# Patient Record
Sex: MALE | Race: WHITE
[De-identification: names, ages, dates, MRNs, and addresses within clinical notes are randomized per-mention and may not be internally consistent; named-entity substitution may affect disease eponyms.]

---

## 2019-02-08 ENCOUNTER — HOSPITAL ENCOUNTER (OUTPATIENT)
Dept: HOSPITAL 95 - ORSCSDS | Age: 64
Discharge: HOME | End: 2019-02-08
Attending: INTERNAL MEDICINE
Payer: COMMERCIAL

## 2019-02-08 VITALS — BODY MASS INDEX: 33.84 KG/M2 | HEIGHT: 70 IN | WEIGHT: 236.38 LBS

## 2019-02-08 DIAGNOSIS — Z12.11: Primary | ICD-10-CM

## 2019-02-08 DIAGNOSIS — I10: ICD-10-CM

## 2019-02-08 DIAGNOSIS — K57.30: ICD-10-CM

## 2019-02-08 DIAGNOSIS — K64.8: ICD-10-CM

## 2019-02-08 DIAGNOSIS — E78.00: ICD-10-CM

## 2019-02-08 DIAGNOSIS — E66.01: ICD-10-CM

## 2019-02-08 DIAGNOSIS — Z79.899: ICD-10-CM

## 2019-02-08 PROCEDURE — 0DJD8ZZ INSPECTION OF LOWER INTESTINAL TRACT, VIA NATURAL OR ARTIFICIAL OPENING ENDOSCOPIC: ICD-10-PCS | Performed by: INTERNAL MEDICINE

## 2019-02-08 NOTE — NUR
02/08/19 0833 Wilma Kelly
PT. SATS RANGING FROM 89% ON RA UP TO 95% ON RA. PT. VERBALIZES
SOMETIMES HE CAN GET SOB WHEN JUST WATCHING T.V. & PUTS A PILLOW UNDER
HIS CHIN TO RAISE HIS CHIN UP WHICH HE VERBALIZES HELPS. PT. DENIES
SLEEP APNEA BUT DOES VERBALIZE THAT HE SNORES & SOMETIMES WAKES UP
FEELING LIKE HE NEEDS TO TAKE A DEEP BREATH.

## 2025-06-06 ENCOUNTER — HOSPITAL ENCOUNTER (OUTPATIENT)
Dept: HOSPITAL 95 - ORSCSDS | Age: 70
Discharge: HOME | End: 2025-06-06
Attending: INTERNAL MEDICINE
Payer: MEDICARE

## 2025-06-06 VITALS — BODY MASS INDEX: 35.85 KG/M2 | WEIGHT: 250.45 LBS | HEIGHT: 70 IN

## 2025-06-06 VITALS — SYSTOLIC BLOOD PRESSURE: 109 MMHG | DIASTOLIC BLOOD PRESSURE: 68 MMHG

## 2025-06-06 DIAGNOSIS — C61: ICD-10-CM

## 2025-06-06 DIAGNOSIS — E78.5: ICD-10-CM

## 2025-06-06 DIAGNOSIS — I10: ICD-10-CM

## 2025-06-06 DIAGNOSIS — C79.51: ICD-10-CM

## 2025-06-06 DIAGNOSIS — Z79.899: ICD-10-CM

## 2025-06-06 DIAGNOSIS — K21.9: ICD-10-CM

## 2025-06-06 DIAGNOSIS — K44.9: ICD-10-CM

## 2025-06-06 DIAGNOSIS — R13.10: Primary | ICD-10-CM

## 2025-06-06 DIAGNOSIS — K22.10: ICD-10-CM

## 2025-06-06 PROCEDURE — 0DJ08ZZ INSPECTION OF UPPER INTESTINAL TRACT, VIA NATURAL OR ARTIFICIAL OPENING ENDOSCOPIC: ICD-10-PCS | Performed by: INTERNAL MEDICINE
